# Patient Record
Sex: FEMALE | Race: WHITE | Employment: FULL TIME
[De-identification: names, ages, dates, MRNs, and addresses within clinical notes are randomized per-mention and may not be internally consistent; named-entity substitution may affect disease eponyms.]

---

## 2017-09-10 ENCOUNTER — HEALTH MAINTENANCE LETTER (OUTPATIENT)
Age: 37
End: 2017-09-10

## 2017-11-27 ENCOUNTER — APPOINTMENT (OUTPATIENT)
Dept: CT IMAGING | Facility: CLINIC | Age: 37
End: 2017-11-27
Attending: EMERGENCY MEDICINE
Payer: COMMERCIAL

## 2017-11-27 ENCOUNTER — TRANSFERRED RECORDS (OUTPATIENT)
Dept: HEALTH INFORMATION MANAGEMENT | Facility: CLINIC | Age: 37
End: 2017-11-27

## 2017-11-27 ENCOUNTER — HOSPITAL ENCOUNTER (EMERGENCY)
Facility: CLINIC | Age: 37
Discharge: HOME | End: 2017-11-28
Attending: EMERGENCY MEDICINE | Admitting: EMERGENCY MEDICINE
Payer: COMMERCIAL

## 2017-11-27 DIAGNOSIS — I49.8 BIGEMINY: ICD-10-CM

## 2017-11-27 DIAGNOSIS — R20.2 PARESTHESIA: ICD-10-CM

## 2017-11-27 DIAGNOSIS — R42 LIGHTHEADEDNESS: ICD-10-CM

## 2017-11-27 LAB
MAGNESIUM SERPL-MCNC: 2.5 MG/DL (ref 1.6–2.3)
TROPONIN I SERPL-MCNC: <0.015 UG/L (ref 0–0.04)

## 2017-11-27 PROCEDURE — 25000132 ZZH RX MED GY IP 250 OP 250 PS 637: Performed by: EMERGENCY MEDICINE

## 2017-11-27 PROCEDURE — 93005 ELECTROCARDIOGRAM TRACING: CPT

## 2017-11-27 PROCEDURE — 99285 EMERGENCY DEPT VISIT HI MDM: CPT | Mod: 25

## 2017-11-27 PROCEDURE — 84484 ASSAY OF TROPONIN QUANT: CPT | Performed by: EMERGENCY MEDICINE

## 2017-11-27 PROCEDURE — 70450 CT HEAD/BRAIN W/O DYE: CPT

## 2017-11-27 PROCEDURE — 25000128 H RX IP 250 OP 636: Performed by: EMERGENCY MEDICINE

## 2017-11-27 PROCEDURE — 83735 ASSAY OF MAGNESIUM: CPT | Performed by: EMERGENCY MEDICINE

## 2017-11-27 PROCEDURE — 70498 CT ANGIOGRAPHY NECK: CPT

## 2017-11-27 RX ORDER — ACETAMINOPHEN 325 MG/1
650 TABLET ORAL ONCE
Status: COMPLETED | OUTPATIENT
Start: 2017-11-27 | End: 2017-11-27

## 2017-11-27 RX ORDER — IOPAMIDOL 755 MG/ML
500 INJECTION, SOLUTION INTRAVASCULAR ONCE
Status: COMPLETED | OUTPATIENT
Start: 2017-11-27 | End: 2017-11-27

## 2017-11-27 RX ADMIN — SODIUM CHLORIDE 80 ML: 9 INJECTION, SOLUTION INTRAVENOUS at 23:01

## 2017-11-27 RX ADMIN — IOPAMIDOL 70 ML: 755 INJECTION, SOLUTION INTRAVENOUS at 23:01

## 2017-11-27 RX ADMIN — ACETAMINOPHEN 650 MG: 325 TABLET, FILM COATED ORAL at 22:48

## 2017-11-27 ASSESSMENT — ENCOUNTER SYMPTOMS
PALPITATIONS: 1
NECK PAIN: 1
HEADACHES: 1
NAUSEA: 1
NUMBNESS: 1
WOUND: 0

## 2017-11-27 NOTE — LETTER
11/28/17      To Whom it may concern:    _________________________ was in our Emergency Department today, 11/28/17. with a patient who needed their assistance.  Please excuse them from work today.       Sincerely,      Arabella Ray RN

## 2017-11-27 NOTE — LETTER
November 28, 2017      To Whom It May Concern:      Carolyn Lewis was seen in our Emergency Department today, 11/28/17. She may return to work Wednesday 11/29/17.    Sincerely,        Arabella Ray RN

## 2017-11-27 NOTE — ED AVS SNAPSHOT
[facility] Emergency Department    [address]    [facility] [address]    Phone:  [phone]    Fax:  [phone]                                       Carolyn Lewis   MRN: [id number]    Department:  [facility] Emergency Department   Date of Visit:  11/27/2017           Patient Information     Date Of Birth          1980        Your diagnoses for this visit were:     Bigeminy     Paresthesia     Lightheadedness        You were seen by Lam Almazan DO.      Follow-up Information     Follow up with Anali Castanon. Call in 2 days.    Specialty:  Family Practice    Why:  As needed    Contact information:    [facility]  [address]  [facility] [address]  [phone]          Call Shukri Navarro MD.    Specialty:  Cardiology    Why:  To make an appointment after discussion with your primary care physician    Contact information:    [address]  [place] [address]  [phone]          Follow up with [facility] Emergency Department.    Specialty:  EMERGENCY MEDICINE    Why:  If symptoms worsen    Contact information:    [address]  [facility] [address]  [phone]        Discharge Instructions         Dizziness (Uncertain Cause)  Dizziness is a common symptom. It may be described as lightheadedness, spinning, or feeling like you are going to faint. Dizziness can have many causes.  Be sure to tell the healthcare provider about:    All medicines you take, including prescription, over-the-counter, herbs, and supplements    Any other symptoms you have    Any health problems you are being treated for    Anything that causes the dizziness to get worse or better  Today's exam did not show an exact cause for your dizziness. Other tests may be needed. Follow up with your healthcare provider.  Home care    Dizziness that occurs with sudden standing may be a sign of mild dehydration. Drink extra fluids  for the next few days.    If you recently started a new medicine, stopped a medicine, or had the dose of a current medicine changed, talk with the prescribing healthcare provider. Your medicine plan may need adjustment.    If dizziness lasts more than a few seconds, sit or lie down until it passes. This may help prevent injury in case you pass out.    Do not drive or use power tools or dangerous equipment until you have had no dizziness for at least 48 hours.  Follow-up care  Follow up with your healthcare provider for further evaluation within the next 7 days or as advised.  When to seek medical advice  Call your healthcare provider for any of the following:    Worsening of symptoms or new symptoms    Passing out or seizure    Repeated vomiting    Headache    Palpitations (the sense that your heart is fluttering or beating fast or hard)    Shortness of breath    Blood in vomit or stool (black or red color)    Weakness of an arm or leg or one side of the face    Vision or hearing changes    Trouble walking or speaking    Chest, arm, neck, back, or jaw pain  Date Last Reviewed: 8/23/2015 2000-2017 [employer]. [address] [place] [address]. All rights reserved. This information is not intended as a substitute for professional medical care. Always follow your healthcare professional's instructions.         * HEADACHE [unspecified]    The cause of your headache today is not clear, but it does not appear to be the sign of any serious illness.  Under stress, some people tense the muscles of their shoulder, neck and scalp without knowing it. If this condition lasts long enough, a TENSION HEADACHE can occur.  A MIGRAINE HEADACHE is caused by changes in blood flow to the brain. It can be mild or severe. A migraine attack may be triggered by emotional stress, hormone changes during the menstrual cycle, oral contraceptives, alcohol use, certain foods containing tyramine, eye strain, weather  changes, missing meals, lack of sleep or oversleeping.  Other causes of headache include a viral illness, sinus, ear or throat infection, dental pain and TMJ (jaw joint) pain.  HOME CARE:    If you were given pain medicine for this headache, do not drive yourself home. Arrange for a ride, instead. When you get home, try to sleep. You should feel much better when you wake up.    If you are having nausea or vomiting, follow a light diet until your headache is relieved.    If you have a migraine type headache, use sunglasses when in the daylight or around bright indoor lighting until symptoms improve. Bright glaring light can worsen this kind of headache.  FOLLOW UP with your doctor if the headache is not better within the next 24 hours. If you have frequent headaches you should discuss a treatment plan with your primary care doctor. By being aware of the earliest signs of headache, and starting treatment right away, you may be able to stop the pain yourself.  GET PROMPT MEDICAL ATTENTION if any of the following occur:    Worsening of your head pain or no improvement within 24 hours    Repeated vomiting (unable to keep liquids down)    Fever over 101 F (38.3 C)    Stiff neck    Extreme drowsiness, confusion or fainting    Weakness of an arm or leg or one side of the face    Difficulty with speech or vision    3253-5383 [employer]. [address] [place] [address], [place] [address]. All rights reserved. This information is not intended as a substitute for professional medical care. Always follow your healthcare professional's instructions.  This information has been modified by your health care provider with permission from the publisher.      24 Hour Appointment Hotline       To make an appointment at any [facility], call [phone] ([phone]). If you don't have a family doctor or clinic, we will help you find one. [facility] are conveniently located to serve the needs of you and your  family.             Review of your medicines      START taking        Dose / Directions Last dose taken    ibuprofen 200 MG tablet   Commonly known as:  ADVIL/MOTRIN   Dose:  600 mg   Quantity:  60 tablet        Take 3 tablets (600 mg) by mouth every 6 hours as needed for mild pain or fever   Refills:  0          Our records show that you are taking the medicines listed below. If these are incorrect, please call your family doctor or clinic.        Dose / Directions Last dose taken    ORTHO TRI-CYCLEN (28) 0.18/0.215/0.25 MG-35 MCG per tablet   Quantity:  30   Generic drug:  norgestim-eth estrad triphasic        1 TABLET DAILY   Refills:  1 yr                Prescriptions were sent or printed at these locations (1 Prescription)                   Other Prescriptions                Printed at Department/Unit printer (1 of 1)         ibuprofen (ADVIL/MOTRIN) 200 MG tablet                Procedures and tests performed during your visit     CT Head Neck Angio w/o & w Contrast    EKG 12-lead, tracing only    Head CT w/o contrast    Magnesium    Peripheral IV: Standard    Troponin I      Orders Needing Specimen Collection     None      Pending Results     Date and Time Order Name Status Description    11/27/2017 2225 CT Head Neck Angio w/o & w Contrast In process     11/27/2017 2225 Head CT w/o contrast In process     11/27/2017 2126 EKG 12-lead, tracing only Preliminary             Pending Culture Results     No orders found for last 3 day(s).            Pending Results Instructions     If you had any lab results that were not finalized at the time of your Discharge, you can call the ED Lab Result RN at [phone]. You will be contacted by this team for any positive Lab results or changes in treatment. The nurses are available 7 days a week from 10A to 6:30P.  You can leave a message 24 hours per day and they will return your call.        Test Results From Your Hospital Stay        11/27/2017 11:18 PM      Component  Results     Component Value Ref Range & Units Status    Troponin I ES <0.015 0.000 - 0.045 ug/L Final    The 99th percentile for upper reference range is 0.045 ug/L.  Troponin values   in the range of 0.045 - 0.120 ug/L may be associated with risks of adverse   clinical events.           11/27/2017 11:18 PM      Component Results     Component Value Ref Range & Units Status    Magnesium 2.5 (H) 1.6 - 2.3 mg/dL Final         11/27/2017 10:59 PM      Result not yet available     Exam Ended         11/27/2017 11:00 PM      Result not yet available     Exam Ended                Clinical Quality Measure: Blood Pressure Screening     Your blood pressure was checked while you were in the emergency department today. The last reading we obtained was  BP: 114/61 . Please read the guidelines below about what these numbers mean and what you should do about them.  If your systolic blood pressure (the top number) is less than 120 and your diastolic blood pressure (the bottom number) is less than 80, then your blood pressure is normal. There is nothing more that you need to do about it.  If your systolic blood pressure (the top number) is 120-139 or your diastolic blood pressure (the bottom number) is 80-89, your blood pressure may be higher than it should be. You should have your blood pressure rechecked within a year by a primary care provider.  If your systolic blood pressure (the top number) is 140 or greater or your diastolic blood pressure (the bottom number) is 90 or greater, you may have high blood pressure. High blood pressure is treatable, but if left untreated over time it can put you at risk for heart attack, stroke, or kidney failure. You should have your blood pressure rechecked by a primary care provider within the next 4 weeks.  If your provider in the emergency department today gave you specific instructions to follow-up with your doctor or provider even sooner than that, you should follow that instruction and not  wait for up to 4 weeks for your follow-up visit.        Thank you for choosing [facility]       Thank you for choosing [facility] for your care. Our goal is always to provide you with excellent care. Hearing back from our patients is one way we can continue to improve our services. Please take a few minutes to complete the written survey that you may receive in the mail after you visit with us. Thank you!        [employer] Information     [employer] gives you secure access to your electronic health record. If you see a primary care provider, you can also send messages to your care team and make appointments. If you have questions, please call your primary care clinic.  If you do not have a primary care provider, please call [phone] and they will assist you.        Care EveryWhere ID     This is your Care EveryWhere ID. This could be used by other organizations to access your [facility] medical records  [id number]        Equal Access to Services     CORNELIA THURMAN : Ash Infante, star naik, yeimi prabhakar. So [facility] [phone].    ATENCIÓN: Si habla español, tiene a correa disposición servicios gratuitos de asistencia lingüística. Llame al [phone].    We comply with applicable federal civil rights laws and [place] laws. We do not discriminate on the basis of race, color, national origin, age, disability, sex, sexual orientation, or gender identity.            After Visit Summary       This is your record. Keep this with you and show to your community pharmacist(s) and doctor(s) at your next visit.

## 2017-11-27 NOTE — ED AVS SNAPSHOT
[facility] Emergency Department    [address]    [facility] [address]    Phone:  [phone]    Fax:  [phone]                                       Carolyn Lewis   MRN: [id number]    Department:  [facility] Emergency Department   Date of Visit:  11/27/2017           After Visit Summary Signature Page     I have received my discharge instructions, and my questions have been answered. I have discussed any challenges I see with this plan with the nurse or doctor.    ..........................................................................................................................................  Patient/Patient Representative Signature      ..........................................................................................................................................  Patient Representative Print Name and Relationship to Patient    ..................................................               ................................................  Date                                            Time    ..........................................................................................................................................  Reviewed by Signature/Title    ...................................................              ..............................................  Date                                                            Time

## 2017-11-28 VITALS
WEIGHT: 135 LBS | OXYGEN SATURATION: 97 % | BODY MASS INDEX: 22.49 KG/M2 | RESPIRATION RATE: 16 BRPM | HEART RATE: 78 BPM | TEMPERATURE: 99 F | SYSTOLIC BLOOD PRESSURE: 111 MMHG | HEIGHT: 65 IN | DIASTOLIC BLOOD PRESSURE: 69 MMHG

## 2017-11-28 LAB — INTERPRETATION ECG - MUSE: NORMAL

## 2017-11-28 RX ORDER — IBUPROFEN 200 MG
600 TABLET ORAL EVERY 6 HOURS PRN
Qty: 60 TABLET | Refills: 0 | Status: SHIPPED | OUTPATIENT
Start: 2017-11-28

## 2017-11-28 NOTE — DISCHARGE INSTRUCTIONS
Dizziness (Uncertain Cause)  Dizziness is a common symptom. It may be described as lightheadedness, spinning, or feeling like you are going to faint. Dizziness can have many causes.  Be sure to tell the healthcare provider about:    All medicines you take, including prescription, over-the-counter, herbs, and supplements    Any other symptoms you have    Any health problems you are being treated for    Anything that causes the dizziness to get worse or better  Today's exam did not show an exact cause for your dizziness. Other tests may be needed. Follow up with your healthcare provider.  Home care    Dizziness that occurs with sudden standing may be a sign of mild dehydration. Drink extra fluids for the next few days.    If you recently started a new medicine, stopped a medicine, or had the dose of a current medicine changed, talk with the prescribing healthcare provider. Your medicine plan may need adjustment.    If dizziness lasts more than a few seconds, sit or lie down until it passes. This may help prevent injury in case you pass out.    Do not drive or use power tools or dangerous equipment until you have had no dizziness for at least 48 hours.  Follow-up care  Follow up with your healthcare provider for further evaluation within the next 7 days or as advised.  When to seek medical advice  Call your healthcare provider for any of the following:    Worsening of symptoms or new symptoms    Passing out or seizure    Repeated vomiting    Headache    Palpitations (the sense that your heart is fluttering or beating fast or hard)    Shortness of breath    Blood in vomit or stool (black or red color)    Weakness of an arm or leg or one side of the face    Vision or hearing changes    Trouble walking or speaking    Chest, arm, neck, back, or jaw pain  Date Last Reviewed: 8/23/2015 2000-2017 [employer]. [address] [place] [address], [place] [address]. All rights reserved. This information is not intended as  a substitute for professional medical care. Always follow your healthcare professional's instructions.         * HEADACHE [unspecified]    The cause of your headache today is not clear, but it does not appear to be the sign of any serious illness.  Under stress, some people tense the muscles of their shoulder, neck and scalp without knowing it. If this condition lasts long enough, a TENSION HEADACHE can occur.  A MIGRAINE HEADACHE is caused by changes in blood flow to the brain. It can be mild or severe. A migraine attack may be triggered by emotional stress, hormone changes during the menstrual cycle, oral contraceptives, alcohol use, certain foods containing tyramine, eye strain, weather changes, missing meals, lack of sleep or oversleeping.  Other causes of headache include a viral illness, sinus, ear or throat infection, dental pain and TMJ (jaw joint) pain.  HOME CARE:    If you were given pain medicine for this headache, do not drive yourself home. Arrange for a ride, instead. When you get home, try to sleep. You should feel much better when you wake up.    If you are having nausea or vomiting, follow a light diet until your headache is relieved.    If you have a migraine type headache, use sunglasses when in the daylight or around bright indoor lighting until symptoms improve. Bright glaring light can worsen this kind of headache.  FOLLOW UP with your doctor if the headache is not better within the next 24 hours. If you have frequent headaches you should discuss a treatment plan with your primary care doctor. By being aware of the earliest signs of headache, and starting treatment right away, you may be able to stop the pain yourself.  GET PROMPT MEDICAL ATTENTION if any of the following occur:    Worsening of your head pain or no improvement within 24 hours    Repeated vomiting (unable to keep liquids down)    Fever over 101 F (38.3 C)    Stiff neck    Extreme drowsiness, confusion or fainting    Weakness of  an arm or leg or one side of the face    Difficulty with speech or vision    4218-1853 [employer]. [address] [place] [address], [place] [address]. All rights reserved. This information is not intended as a substitute for professional medical care. Always follow your healthcare professional's instructions.  This information has been modified by your health care provider with permission from the publisher.

## 2017-11-28 NOTE — ED PROVIDER NOTES
History     Chief Complaint:  Irregular Heart Beat    The history is provided by the patient.      Carolyn Lewis is an otherwise healthy 37 year old female who presents with an irregular heartbeat. The patient states she woke up this morning with a neck ache, and she thought she slept funny. Around noon today, she noticed a dull headache, but she did not think much of this either. While she was driving home from work around [id number], the patient felt nauseous and a tingling sensation in her face, particularly near her jaw. The area was also numb, which concerned her, so she went to [facility] for evaluation. She denies any ringing in the ears. The doctor there noted she had an irregular heartbeat, so they performed an ECG and labs, results below, and sent her to the ED for further evaluation. On my evaluation, she is not feeling nauseous and the tingling sensation in her jaw has subsided. She still has a small headache, but otherwise is only feeling tired and a bit anxious. Of note, the patient's father recently had a pulmonary embolism and there is familial history of factor V Leiden disorder. Additionally, the patient states she does not have headaches frequently, but when she does, they consist of a pressure-like discomfort localized the back of her eyes.    11/27/17 ECG (19:58:20):  Rate 83 bpm. VT interval 298. QRS duration 100. QT/QTc 354/394. P-R-T axes 36 34 34. Sinus rhythm with bigeminal PVCs with 1st degree A-V block. Possible right atrial abnormality. ST junctional depression is nonspecific. Abnormal ECG. Interpreted at 1958 by Koby Malloy MD at [facility].    11/27/17 Laboratory:  CBC: WNL (WBC 8.3, HGB 14.1, )  CMP: Glucose 114 (H), Globulin 4.2 (H), o/w WNL (Creatinine 0.72)  TSH: 2.474  T4 free: 0.97    Allergies:  Codeine: hives     Medications:    Ortho tri-cyclen    Past Medical History:    The patient does not have any past pertinent medical  "history.    Past Surgical History:    History reviewed. No pertinent surgical history.    Family History:    Lipids    Social History:  Smoking status: No  Alcohol use: Yes, 3 drinks weekly  PCP: Nathalia Zimmerman MD  Presents to the ED with her spouse  Marital Status:      Review of Systems   HENT: Negative for ear pain and tinnitus.    Cardiovascular: Positive for palpitations. Negative for chest pain and leg swelling.   Gastrointestinal: Positive for nausea.   Musculoskeletal: Positive for neck pain.   Skin: Negative for wound.   Neurological: Positive for numbness and headaches.   All other systems reviewed and are negative.    Physical Exam     Patient Vitals for the past 24 hrs:   BP Temp Temp src Pulse Heart Rate Resp SpO2 Height Weight   11/27/17 2330 - - - - 83 - 97 % - -   11/27/17 2230 114/61 - - - 87 - 96 % - -   11/27/17 2215 126/79 - - - 93 - 97 % - -   11/27/17 2200 127/82 - - - 89 - 98 % - -   11/27/17 2139 124/80 99  F (37.2  C) Temporal 78 - 16 100 % 1.651 m (5' 5\") 61.2 kg (135 lb)     Physical Exam  Constitutional: Patient appears well-developed and well-nourished.   HENT:   Head: No external signs of trauma noted.  Eyes: Pupils are equal, round, and reactive to light. No papilledema noted on limited funduscopic exam  Cardiovascular: Normal rate, regular rhythm, normal heart sounds and intact distal pulses.    Pulmonary/Chest: Effort normal and breath sounds normal. No respiratory distress. No wheezes noted.   Abdominal: Soft. There is no tenderness. There is no rebound.   Neurological:    Patient is alert and oriented to person, place, and time.    Speech is fluent, cognition is normal.   CN 2-12 intact (PERRL, EOMI, symmetric smile, equal eye squeeze and forehead raise, normal and equal sensation to bilateral forehead/cheek/chin, equal hearing to finger rub, midline tongue protrusion with nl side-to-side movement, normal shoulder shrug).    RUE strength 5/5: , finger abd, wrist " flex/ext, elbow flex/ext.    LUE strength 5/5: , finger abd, wrist flex/ext, elbow flex/ext.    RLE strength 5/5: ankle flex/ext, knee flex/ext, hip flex.    LLE strength 5/5: ankle flex/ext, knee flex/ext, hip flex.    Sensation equal in all 4 extremities.    No arm drift.     Cerebellar: Normal rapid alternating movements     ( finger-nose-finger, rapid pronation/supination, hand rolling)    Normal heel-to-shin   Normal gait.   Skin: Skin is warm and dry.     Emergency Department Course   ECG (21:30:09):  Rate 82 bpm. VA interval 126. QRS duration 98. QT/QTc 362/422. P-R-T axes -20 40 41. Sinus rhythm with premature atrial complexes. Bigeminy pattern. Incomplete right bundle branch block. Borderline ECG. Interpreted at 2132 by Lam Almazan DO.    Imaging:  Radiographic findings were communicated with the patient who voiced understanding of the findings.    Head CT w/o contrast:  Noncontrast head CT demonstrates no hemorrhage, mass/mass effect or CT evidence of acute infarct.  As read by Radiology.    CT Head Neck Angio w/o & w Contrast:  Head CTA demonstrates no aneurysm or significant stenosis in the proximal intracranial vessels. No significant stenosis in the neck vessels based on the NASCET criteria. No evidence for dissection.  As read by Radiology.    Laboratory:  Troponin: <0.015  Magnesium: 2.5 (H)    Interventions:  2248: 650 mg Tylenol PO    Emergency Department Course:  Past medical records, nursing notes, and vitals reviewed.  2202: I performed an exam of the patient and obtained history, as documented above.  ECG performed, results above.  IV inserted and blood drawn.  The patient was sent for a head CT and a head/neck CT angiogram while in the emergency department, findings above.    0055: I rechecked the patient. Explained findings to the patient and her spouse.    I rechecked the patient. Findings and plan explained to the patient. Patient discharged home with instructions regarding  supportive care, medications, and reasons to return. The importance of close follow-up was reviewed.     Impression & Plan      Medical Decision Making:  Carolyn Lewis is a 37 year old female who presents to the ER for evaluation of lightheadedness, jaw tingling, headache, and cardiac arrhythmia. Please see the HPI and exam for specifics. The patient has been in a bigeminy pattern during her ED stay. She is otherwise remained stable with normal blood pressure and normal mental status. There is a family history of factor V Leiden, and given the patient's tingling in her face, even in light of an entirely normal neurologic exam, I had a shared decision-making discussion with the patient and her  and we did elect to proceed with CT scanning of the brain. There was no stroke noted. Her headache has improved as well. At this time, as the patient has remained well, I believe it is agreeable to discharge the patient. She should follow-up in the outpatient setting. I will encourage cardiology follow-up as well as primary care follow-up. Anticipatory guidance given prior to the discharge.        Impressions:    ICD-10-CM   1. Bigeminy I49.9   2. Paresthesia R20.2   3. Lightheadedness R42     Disposition: Discharged to home    Discharge Medications:  New Prescriptions    IBUPROFEN (ADVIL/MOTRIN) 200 MG TABLET    Take 3 tablets (600 mg) by mouth every 6 hours as needed for mild pain or fever     Yaneth Moses  11/27/2017   [facility] EMERGENCY DEPARTMENT    IYaneth, am serving as a scribe at 10:02 PM on 11/27/2017 to document services personally performed by Lam Almazan DO based on my observations and the provider's statements to me.        Lam Almazan DO  11/28/17 0205

## 2019-11-08 ENCOUNTER — HEALTH MAINTENANCE LETTER (OUTPATIENT)
Age: 39
End: 2019-11-08

## 2020-02-23 ENCOUNTER — HEALTH MAINTENANCE LETTER (OUTPATIENT)
Age: 40
End: 2020-02-23

## 2020-09-27 ENCOUNTER — APPOINTMENT (OUTPATIENT)
Dept: CT IMAGING | Facility: CLINIC | Age: 40
End: 2020-09-27
Attending: PHYSICIAN ASSISTANT
Payer: COMMERCIAL

## 2020-09-27 ENCOUNTER — HOSPITAL ENCOUNTER (EMERGENCY)
Facility: CLINIC | Age: 40
Discharge: HOME | End: 2020-09-27
Attending: PHYSICIAN ASSISTANT | Admitting: PHYSICIAN ASSISTANT
Payer: COMMERCIAL

## 2020-09-27 VITALS
HEART RATE: 75 BPM | DIASTOLIC BLOOD PRESSURE: 75 MMHG | RESPIRATION RATE: 18 BRPM | OXYGEN SATURATION: 100 % | TEMPERATURE: 97.1 F | SYSTOLIC BLOOD PRESSURE: 119 MMHG

## 2020-09-27 DIAGNOSIS — S09.90XA CLOSED HEAD INJURY, INITIAL ENCOUNTER: ICD-10-CM
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
## 2020-09-27 PROCEDURE — 99285 EMERGENCY DEPT VISIT HI MDM: CPT | Mod: 25

## 2020-09-27 PROCEDURE — 70450 CT HEAD/BRAIN W/O DYE: CPT

## 2020-09-27 PROCEDURE — 93005 ELECTROCARDIOGRAM TRACING: CPT

## 2020-09-27 ASSESSMENT — ENCOUNTER SYMPTOMS
ABDOMINAL PAIN: 0
VOMITING: 0
DIZZINESS: 1
FEVER: 0
DIARRHEA: 0
HEADACHES: 1
SHORTNESS OF BREATH: 0

## 2020-09-27 NOTE — ED TRIAGE NOTES
Patient states she hit her head last night but does not remember the event.  told her she had hit it in garage. Patient states today she feels like her equilibrium is off and has a slight headache and dizziness. ABC intact alert and no distress.

## 2020-09-27 NOTE — ED PROVIDER NOTES
History     Chief Complaint:  Head Injury       HPI   Carolyn Lewis is a 40 year old female who presents with a head injury after a fall. The patient says that yesterday evening she was out with her friends and . She says that she had a few drinks, but was not intoxicated. Per the patient's , the patient had tripped and fallen and hit her head on a car. The  says that the patient did not lose consciousness and was able to get up right away. He says that the patient went around walking and talking like normally after for an hour or so and then she went to bed. The patient says that she woke up in the middle of the night and was unable to recall how she got to bed. She says that she is unable to recall the moments after the fall or anything that happened afterwards until she woke up in bed. She says that today she felt that her balance was off and she also had a dull ache at the base of her head near her neck as well as slight dizziness. The patient called the nurse line and was advised to come to the ED. She denies any chest pain, shortness of breath, vision changes, abdominal pain, or any nausea/vomiting.       Allergies:  Codeine     Medications:    Ibuprofen  Ortho-Cyclen     Past Medical History:    Past medical history reviewed. No pertinent medical history.     Past Surgical History:    [place] surgery    Family History:    Lipids    Uterine cancer     Social History:  Smoking Status: Never Smoker  Alcohol Use: Positive  PCP: Anali Castanon    Review of Systems   Constitutional: Negative for fever.   Eyes: Negative for visual disturbance.   Respiratory: Negative for shortness of breath.    Cardiovascular: Negative for chest pain.   Gastrointestinal: Negative for abdominal pain, diarrhea and vomiting.   Neurological: Positive for dizziness and headaches. Negative for syncope.   All other systems reviewed and are negative.      Physical Exam     Patient Vitals for the past 24 hrs:   BP Temp  Temp src Pulse Resp SpO2   09/27/20 1500 110/69 -- -- 74 -- 99 %   09/27/20 1445 -- -- -- -- -- 100 %   09/27/20 1430 116/75 -- -- 73 -- 98 %   09/27/20 1415 -- -- -- -- -- 100 %   09/27/20 1400 111/60 -- -- 52 -- 99 %   09/27/20 1345 -- -- -- -- -- 99 %   09/27/20 1330 108/68 -- -- 78 -- 100 %   09/27/20 1315 120/71 -- -- -- -- 99 %   09/27/20 1310 120/71 -- -- 80 18 100 %   09/27/20 1243 113/79 97.1  F (36.2  C) -- 90 20 99 %   09/27/20 1241 113/79 97.7  F (36.5  C) Temporal 90 20 99 %        Physical Exam  Constitutional: Pleasant. Cooperative.   Eyes: Pupils equally round and reactive  HENT: No scalp hematoma. No scalp tenderness. No bony step-off or crepitus. No facial bone tenderness or instability. No hemotympanum. No periorbital ecchymosis or Wood signs. Oropharynx is normal with moist mucus membranes. No evidence for intraoral injury.  Cardiovascular: Regular rate and rhythm and without murmurs.  Respiratory: Normal respiratory effort, lungs are clear bilaterally.  GI: Abdomen is soft, non-tender, non-distended. No guarding, rebound, or rigidity.  Musculoskeletal: No midline cervical, thoracic, or lumbar tenderness. Normal painless ROM of the neck. No clavicular tenderness. No upper extremity tenderness. No lower extremity tenderness. Normal ROM of extremities. No tenderness with compression of the rib cage or pelvis.  Skin: No rashes. No lacerations or abrasions noted.  Neurologic: Cranial nerves II-XII intact, normal cognition, no focal deficits. Alert and oriented x 3. Normal  strength. Normal leg raise. Sensation to light touch intact throughout all 4 extremities. 5/5 strength with dorsiflexion and plantarflexion bilaterally. GCS 15  Psychiatric: Normal affect.  Nursing notes and vital signs reviewed.      Emergency Department Course     ECG:  ECG taken at 1423, ECG read at 1424  Normal sinus rhythm  Normal ECG  Rate 76 bpm. VT interval 116 ms. QRS duration 96 ms. QT/QTc 378/425 ms. P-R-T axes -16  44 48.    Imaging:  Radiology findings were communicated with the patient who voiced understanding of the findings.    CT Head w/o Contrast  1. No fracture and no intracranial hemorrhage.  2. Negative head CT.  Reading per radiology     Emergency Department Course:    1345 Nursing notes and vitals reviewed. I performed an exam of the patient as documented above.     [place] Head CT Rule  (calculator)  Background  Assesses need for head imaging in acute trauma  Only validated in adults 18 and older, with GCS 15 and with blunt head trauma occurring within the prior 24 hours and resulting in loss of consciousness, amnesia, or disorientation  Data   40 year old  Head CT is NOT indicated if ALL of the following criteria ABSENT   Of 7 possible items (headache, vomiting, age>60, intoxicated, anterograde amnesia, supraclavicular signs of trauma, seizure)  Drug intoxication or alcohol intoxication  Interpretation  One or more Head CT criteria are present; Head imaging may be indicated    1531 The patient was sent for a CT while in the emergency department, results above.      1600 Patient rechecked and updated.       Findings and plan explained to the Patient. Patient discharged home with instructions regarding supportive care, medications, and reasons to return. The importance of close follow-up was reviewed.     Impression & Plan      Medical Decision Making:  Carolyn Lewis is a 40 year old female who presents to the emergency department today for evaluation of a head injury following a fall.  Patient was consuming alcohol last night when she had a fall backward, striking her head.  She does note having some retrograde and anterior grade amnesia to the event.  She is unclear if this is related to alcohol or the fall.  See HPI as above for additional details.  Vitals and physical exam as above.  Head CT obtained as above without evidence for intracranial abnormality or skull fracture.  Patient's neurologic exam is  reassuring here in the emergency department.  I do suspect concussion at this time. Patient did note distant history of benign arrhythmia, so ECG ordered, this returned unremarkable. The patient will return to the ED for any red flag signs, including change in behavior, severe headache, drowsiness, seizures, vomiting, etc. Felt patient was safe for discharge to home.  Discussed reasons to return. All questions answered. Patient discharged to home in stable condition.    Diagnosis:    ICD-10-CM    1. Closed head injury, initial encounter  S09.90XA    2. Fall, initial encounter  W19.XXXA      Disposition:   The patient is discharged to home.     Discharge Medications:  New Prescriptions    No medications on file       Scribe Disclosure:  I, Tal Wright, am serving as a scribe at 1:55 PM on 9/27/2020 to document services personally performed by Edison Hogan PA-C based on my observations and the provider's statements to me.    This record was created at least in part using electronic voice recognition software, so please excuse any typographical errors.          Edison Hogan PA-C  09/27/20 3249

## 2020-09-27 NOTE — ED AVS SNAPSHOT
[facility] Department  [address]  [facility] [address]  Phone:  [phone]  Fax:  [phone]                                    Carolyn Lewis   MRN: [id number]    Department:  [facility] Emergency Department   Date of Visit:  9/27/2020           After Visit Summary Signature Page    I have received my discharge instructions, and my questions have been answered. I have discussed any challenges I see with this plan with the nurse or doctor.    ..........................................................................................................................................  Patient/Patient Representative Signature      ..........................................................................................................................................  Patient Representative Print Name and Relationship to Patient    ..................................................               ................................................  Date                                   Time    ..........................................................................................................................................  Reviewed by Signature/Title    ...................................................              ..............................................  Date                                               Time          22EPIC Rev 08/18

## 2020-09-28 LAB — INTERPRETATION ECG - MUSE: NORMAL

## 2020-12-06 ENCOUNTER — HEALTH MAINTENANCE LETTER (OUTPATIENT)
Age: 40
End: 2020-12-06

## 2021-09-25 ENCOUNTER — HEALTH MAINTENANCE LETTER (OUTPATIENT)
Age: 41
End: 2021-09-25

## 2022-01-15 ENCOUNTER — HEALTH MAINTENANCE LETTER (OUTPATIENT)
Age: 42
End: 2022-01-15

## 2023-04-22 ENCOUNTER — HEALTH MAINTENANCE LETTER (OUTPATIENT)
Age: 43
End: 2023-04-22

## 2024-02-10 ENCOUNTER — HEALTH MAINTENANCE LETTER (OUTPATIENT)
Age: 44
End: 2024-02-10